# Patient Record
Sex: MALE | Race: BLACK OR AFRICAN AMERICAN | URBAN - METROPOLITAN AREA
[De-identification: names, ages, dates, MRNs, and addresses within clinical notes are randomized per-mention and may not be internally consistent; named-entity substitution may affect disease eponyms.]

---

## 2017-07-19 RX ORDER — TADALAFIL 20 MG/1
20 TABLET ORAL AS NEEDED
Qty: 30 TABLET | Refills: 5 | Status: SHIPPED | OUTPATIENT
Start: 2017-07-19 | End: 2017-12-14

## 2017-07-19 NOTE — TELEPHONE ENCOUNTER
Per [facility] pharmacy the patients coupon requires a new prescription for Cialis. Medication pended if ok to refill.     Requesting: Cialis  LOV: 12/5/16  RTC: 6 months  Last Labs: 12/2/16 only CMP, Lipid  Filled: 12/5/16 #30 with 5 refills    No future andrew

## 2017-08-09 ENCOUNTER — OFFICE VISIT (OUTPATIENT)
Dept: FAMILY MEDICINE CLINIC | Facility: CLINIC | Age: 47
End: 2017-08-09

## 2017-08-09 VITALS
SYSTOLIC BLOOD PRESSURE: 130 MMHG | BODY MASS INDEX: 29.8 KG/M2 | DIASTOLIC BLOOD PRESSURE: 80 MMHG | HEIGHT: 72 IN | HEART RATE: 64 BPM | RESPIRATION RATE: 16 BRPM | WEIGHT: 220 LBS

## 2017-08-09 DIAGNOSIS — M25.511 RIGHT SHOULDER PAIN, UNSPECIFIED CHRONICITY: Primary | ICD-10-CM

## 2017-08-09 PROCEDURE — 99213 OFFICE O/P EST LOW 20 MIN: CPT | Performed by: INTERNAL MEDICINE

## 2017-08-09 RX ORDER — METHYLPREDNISOLONE 4 MG/1
TABLET ORAL
Qty: 1 KIT | Refills: 0 | Status: SHIPPED | OUTPATIENT
Start: 2017-08-09 | End: 2018-02-10

## 2017-08-09 RX ORDER — DICLOFENAC SODIUM 75 MG/1
TABLET, DELAYED RELEASE ORAL
Qty: 60 TABLET | Refills: 2 | Status: SHIPPED | OUTPATIENT
Start: 2017-08-09 | End: 2018-02-10

## 2017-08-09 NOTE — PROGRESS NOTES
CC: Patient presents with:  Shoulder Pain: R x 1 week       HPI:   Rt shoulder pain, for 1 week, aching with movements. Current Outpatient Prescriptions:  methylPREDNISolone (MEDROL) 4 MG Oral Tablet Therapy Pack As directed.  Disp: 1 kit Rfl: 0 Signed Prescriptions Disp Refills    methylPREDNISolone (MEDROL) 4 MG Oral Tablet Therapy Pack 1 kit 0      Sig: As directed. Diclofenac Sodium 75 MG Oral Tab EC 60 tablet 2      Sig: Take 1 tablet BID for 10 days and then prn.           None     A

## 2017-12-14 RX ORDER — ZOLPIDEM TARTRATE 10 MG/1
TABLET ORAL
Qty: 30 TABLET | Refills: 5 | Status: CANCELLED
Start: 2017-12-14

## 2017-12-14 NOTE — TELEPHONE ENCOUNTER
Requesting lab orders and refills   Zolpidem Tartrate 10mg, Atorvastatin 10 mg and Cialis 20 mg  LOV: 8/9/17  RTC: 3 mos  Last Labs: 12/102/16  Filled: Zolpidem Umymdeol63/5/16 #30 with 5 refills  Atorvastatin 12/5/16#90 with 3 refills  Cialis 20 mg 07/19/

## 2017-12-14 NOTE — TELEPHONE ENCOUNTER
Pt would like a refill on all these 3 medication and  also wants lab order s his previous order     Zolpidem Tartrate 10 MG Oral Tab 30 tablet 5 2016    Sig :  TAKE 1 TABLET BY MOUTH NIGHTLY AS NEEDED FOR SLEEP     Route:   (none)         Atorv

## 2017-12-18 ENCOUNTER — OFFICE VISIT (OUTPATIENT)
Dept: OCCUPATIONAL MEDICINE | Age: 47
End: 2017-12-18

## 2017-12-18 DIAGNOSIS — Z02.89 VISIT FOR OCCUPATIONAL HEALTH EXAMINATION: Primary | ICD-10-CM

## 2017-12-18 DIAGNOSIS — Z02.89 ENCOUNTER FOR OCCUPATIONAL HEALTH EXAMINATION: ICD-10-CM

## 2017-12-18 PROCEDURE — OH063 AUDIOMETRIC TESTING INTERP: Performed by: PREVENTIVE MEDICINE

## 2017-12-18 PROCEDURE — 92552 PURE TONE AUDIOMETRY AIR: CPT | Performed by: PREVENTIVE MEDICINE

## 2017-12-18 PROCEDURE — OH123 RAPID TEST DRUG KIT & COLLECTION 5 PANEL: Performed by: PREVENTIVE MEDICINE

## 2017-12-20 RX ORDER — ATORVASTATIN CALCIUM 10 MG/1
10 TABLET, FILM COATED ORAL
Qty: 30 TABLET | Refills: 0 | Status: SHIPPED | OUTPATIENT
Start: 2017-12-20 | End: 2018-02-10

## 2017-12-20 RX ORDER — TADALAFIL 20 MG/1
20 TABLET ORAL AS NEEDED
Qty: 30 TABLET | Refills: 0 | Status: SHIPPED | OUTPATIENT
Start: 2017-12-20 | End: 2018-02-10

## 2018-01-18 RX ORDER — ATORVASTATIN CALCIUM 10 MG/1
TABLET, FILM COATED ORAL
Qty: 30 TABLET | Refills: 0 | Status: CANCELLED | OUTPATIENT
Start: 2018-01-18

## 2018-01-18 NOTE — TELEPHONE ENCOUNTER
Cholesterol Medication Protocol Failed1/18 11:45 AM   ALT < 80    ALT resulted within past year    Lipid panel within past 12 months    Appointment within past 12 or next 3 months     Requesting atorvastatin 10mg  LOV: 8/9/17  RTC: 3 months  Last Relevant Labs: 12/2/16  Filled: 12/20/17 # 30  with 0 refills    No future appointments.     Please call patient to schedule appt to refill medication

## 2018-02-06 NOTE — TELEPHONE ENCOUNTER
Patient made appt for Dr. Saleem Medrano for 2/10/18 - will get labs and refills. Please close this encounter that has refill pending. Thank you.

## 2018-02-06 NOTE — TELEPHONE ENCOUNTER
Future Appointments  Date Time Provider Amari Evans   2/10/2018 10:30 AM Vic Ramírez MD [facility] [address]

## 2018-02-10 ENCOUNTER — OFFICE VISIT (OUTPATIENT)
Dept: FAMILY MEDICINE CLINIC | Facility: CLINIC | Age: 48
End: 2018-02-10

## 2018-02-10 VITALS
SYSTOLIC BLOOD PRESSURE: 122 MMHG | RESPIRATION RATE: 16 BRPM | HEART RATE: 66 BPM | WEIGHT: 223 LBS | BODY MASS INDEX: 30.2 KG/M2 | DIASTOLIC BLOOD PRESSURE: 86 MMHG | HEIGHT: 72 IN

## 2018-02-10 DIAGNOSIS — G47.00 INSOMNIA, UNSPECIFIED TYPE: ICD-10-CM

## 2018-02-10 DIAGNOSIS — M54.5 ACUTE MIDLINE LOW BACK PAIN, WITH SCIATICA PRESENCE UNSPECIFIED: ICD-10-CM

## 2018-02-10 DIAGNOSIS — Z12.5 PROSTATE CANCER SCREENING: ICD-10-CM

## 2018-02-10 DIAGNOSIS — R79.89 ELEVATED LFTS: ICD-10-CM

## 2018-02-10 DIAGNOSIS — E78.00 PURE HYPERCHOLESTEROLEMIA: Primary | ICD-10-CM

## 2018-02-10 DIAGNOSIS — M25.511 CHRONIC RIGHT SHOULDER PAIN: ICD-10-CM

## 2018-02-10 DIAGNOSIS — G89.29 CHRONIC RIGHT SHOULDER PAIN: ICD-10-CM

## 2018-02-10 DIAGNOSIS — N52.9 ERECTILE DYSFUNCTION, UNSPECIFIED ERECTILE DYSFUNCTION TYPE: ICD-10-CM

## 2018-02-10 DIAGNOSIS — Z86.2 HISTORY OF ANEMIA: ICD-10-CM

## 2018-02-10 PROCEDURE — 99214 OFFICE O/P EST MOD 30 MIN: CPT | Performed by: FAMILY MEDICINE

## 2018-02-10 RX ORDER — MELOXICAM 15 MG/1
15 TABLET ORAL
Qty: 30 TABLET | Refills: 2 | Status: SHIPPED | OUTPATIENT
Start: 2018-02-10 | End: 2019-10-07 | Stop reason: ALTCHOICE

## 2018-02-10 RX ORDER — TADALAFIL 20 MG/1
20 TABLET ORAL AS NEEDED
Qty: 30 TABLET | Refills: 0 | Status: SHIPPED | OUTPATIENT
Start: 2018-02-10 | End: 2018-07-20

## 2018-02-10 RX ORDER — ZOLPIDEM TARTRATE 10 MG/1
TABLET ORAL
Qty: 30 TABLET | Refills: 2 | Status: SHIPPED | OUTPATIENT
Start: 2018-02-10 | End: 2019-03-29

## 2018-02-10 RX ORDER — ATORVASTATIN CALCIUM 10 MG/1
10 TABLET, FILM COATED ORAL
Qty: 90 TABLET | Refills: 0 | Status: SHIPPED | OUTPATIENT
Start: 2018-02-10 | End: 2018-05-14

## 2018-02-10 NOTE — PROGRESS NOTES
[facility] Visit Note  2/10/2018      Subjective:      Patient ID: Jessika Robertson is a 52year old male.     Chief Complaint:  Patient presents with:  Medication Follow-Up  Shoulder Pain: still c/o of R shoulder pain - saw Dr. Philip Chu in 8/2017  Low shoulder: pain with extreme of external rotation, Neers and lift off with 5-/5 strength and less strength compared to the L side. Rest of shoulder exam normal (speed's test, internal rotation, Hawkin's, cross arm, supraspinatus)      Assessment:     1.  Pur

## 2018-02-10 NOTE — PATIENT INSTRUCTIONS
Exercises to Strengthen Your Lower Back  Strong lower back and abdominal muscles work together to support your spine. The exercises below will help strengthen the lower back. It is important that you begin exercising slowly and increase levels gradually. Standin. Wall squats: Stand with your back against the wall. Move your feet about 12 inches away from the wall. Tighten your stomach muscles, and slowly bend your knees until they are at about a 45 degree angle. Do not go down too far.  Hold about 5 se 4. Abdominal crunch: Perform a pelvic tilt (above) flattening your lower back against the floor. Holding the tension in your abdominal muscles, take another breath and raise your shoulder blades off the ground (this is not a full sit-up).  Keep your head in The abdominal lift brace with march strengthens your lower abdominal muscles, helping you keep your pelvis and back stable:  · Lie on the floor with both knees bent. Put your feet flat on the floor and your arms by your sides.  Tighten your abdominal musc · Hold for 5 seconds. Return to starting position. · Repeat 5 times. Date Last Reviewed: 10/11/2015  © 1056-2190 [employer]. [address] [facility], [address] [place] [address]. All rights reserved.  This information is not intended as a substitute f To start, lie on your back with your knees bent and feet flat on the floor. Don’t press your neck or lower back to the floor. Breathe deeply. You should feel comfortable and relaxed in this position. · Rest your right ankle on your left knee.   · Place a t Frozen shoulder is another name for adhesive capsulitis, which causes restricted movement in the shoulder. If you have frozen shoulder, this stretch may cause discomfort, especially when you first get started.  A few months may pass before you achieve the r Frozen shoulder is another name for adhesive capsulitis. This causes restricted movement in the shoulder. If you have frozen shoulder, this stretch may cause discomfort, especially when you first get started.  A few months may pass before you achieve the re © 1598-5770 [employer] [address]. [address] [facility], [facility] [place]. All rights reserved. This information is not intended as a substitute for professional medical care. Always follow your healthcare professional's instructions.

## 2018-02-13 ENCOUNTER — LAB ENCOUNTER (OUTPATIENT)
Dept: LAB | Age: 48
End: 2018-02-13
Attending: FAMILY MEDICINE
Payer: COMMERCIAL

## 2018-02-13 DIAGNOSIS — Z12.5 PROSTATE CANCER SCREENING: ICD-10-CM

## 2018-02-13 DIAGNOSIS — E78.00 PURE HYPERCHOLESTEROLEMIA: ICD-10-CM

## 2018-02-13 DIAGNOSIS — Z86.2 HISTORY OF ANEMIA: ICD-10-CM

## 2018-02-13 LAB
ALBUMIN SERPL-MCNC: 3.9 G/DL (ref 3.5–4.8)
ALP LIVER SERPL-CCNC: 67 U/L (ref 45–117)
ALT SERPL-CCNC: 56 U/L (ref 17–63)
AST SERPL-CCNC: 45 U/L (ref 15–41)
BASOPHILS # BLD AUTO: 0.05 X10(3) UL (ref 0–0.1)
BASOPHILS NFR BLD AUTO: 0.9 %
BILIRUB SERPL-MCNC: 1.1 MG/DL (ref 0.1–2)
BUN BLD-MCNC: 9 MG/DL (ref 8–20)
CALCIUM BLD-MCNC: 9.5 MG/DL (ref 8.3–10.3)
CHLORIDE: 103 MMOL/L (ref 101–111)
CHOLEST SMN-MCNC: 202 MG/DL (ref ?–200)
CO2: 30 MMOL/L (ref 22–32)
COMPLEXED PSA SERPL-MCNC: 0.92 NG/ML (ref 0.01–4)
CREAT BLD-MCNC: 1.02 MG/DL (ref 0.7–1.3)
EOSINOPHIL # BLD AUTO: 0.28 X10(3) UL (ref 0–0.3)
EOSINOPHIL NFR BLD AUTO: 5.1 %
ERYTHROCYTE [DISTWIDTH] IN BLOOD BY AUTOMATED COUNT: 13.8 % (ref 11.5–16)
GLUCOSE BLD-MCNC: 86 MG/DL (ref 70–99)
HCT VFR BLD AUTO: 39.5 % (ref 37–53)
HDLC SERPL-MCNC: 62 MG/DL (ref 45–?)
HDLC SERPL: 3.26 {RATIO} (ref ?–4.97)
HGB BLD-MCNC: 13.4 G/DL (ref 13–17)
IMMATURE GRANULOCYTE COUNT: 0 X10(3) UL (ref 0–1)
IMMATURE GRANULOCYTE RATIO %: 0 %
LDLC SERPL CALC-MCNC: 123 MG/DL (ref ?–130)
LYMPHOCYTES # BLD AUTO: 2.14 X10(3) UL (ref 0.9–4)
LYMPHOCYTES NFR BLD AUTO: 39.3 %
M PROTEIN MFR SERPL ELPH: 8.2 G/DL (ref 6.1–8.3)
MCH RBC QN AUTO: 27.2 PG (ref 27–33.2)
MCHC RBC AUTO-ENTMCNC: 33.9 G/DL (ref 31–37)
MCV RBC AUTO: 80.3 FL (ref 80–99)
MONOCYTES # BLD AUTO: 0.47 X10(3) UL (ref 0.1–1)
MONOCYTES NFR BLD AUTO: 8.6 %
NEUTROPHIL ABS PRELIM: 2.51 X10 (3) UL (ref 1.3–6.7)
NEUTROPHILS # BLD AUTO: 2.51 X10(3) UL (ref 1.3–6.7)
NEUTROPHILS NFR BLD AUTO: 46.1 %
NONHDLC SERPL-MCNC: 140 MG/DL (ref ?–130)
PLATELET # BLD AUTO: 221 10(3)UL (ref 150–450)
POTASSIUM SERPL-SCNC: 4 MMOL/L (ref 3.6–5.1)
RBC # BLD AUTO: 4.92 X10(6)UL (ref 4.3–5.7)
RED CELL DISTRIBUTION WIDTH-SD: 40.1 FL (ref 35.1–46.3)
SODIUM SERPL-SCNC: 140 MMOL/L (ref 136–144)
TRIGL SERPL-MCNC: 87 MG/DL (ref ?–150)
VLDLC SERPL CALC-MCNC: 17 MG/DL (ref 5–40)
WBC # BLD AUTO: 5.5 X10(3) UL (ref 4–13)

## 2018-02-13 PROCEDURE — 36415 COLL VENOUS BLD VENIPUNCTURE: CPT | Performed by: FAMILY MEDICINE

## 2018-02-13 PROCEDURE — 84153 ASSAY OF PSA TOTAL: CPT | Performed by: FAMILY MEDICINE

## 2018-02-13 PROCEDURE — 80053 COMPREHEN METABOLIC PANEL: CPT | Performed by: FAMILY MEDICINE

## 2018-02-13 PROCEDURE — 85025 COMPLETE CBC W/AUTO DIFF WBC: CPT | Performed by: FAMILY MEDICINE

## 2018-02-13 PROCEDURE — 80061 LIPID PANEL: CPT | Performed by: FAMILY MEDICINE

## 2018-04-05 DIAGNOSIS — E78.00 PURE HYPERCHOLESTEROLEMIA: ICD-10-CM

## 2018-04-05 RX ORDER — ATORVASTATIN CALCIUM 10 MG/1
TABLET, FILM COATED ORAL
Qty: 30 TABLET | Refills: 0 | OUTPATIENT
Start: 2018-04-05

## 2018-04-05 NOTE — TELEPHONE ENCOUNTER
Requesting Atorvastatin  LOV:  2/10/18  RTC: none   Last Relevant Labs: pp  Filled: 2/10/18 #90 with 0 refills    No future appointments.

## 2018-05-14 DIAGNOSIS — E78.00 PURE HYPERCHOLESTEROLEMIA: ICD-10-CM

## 2018-05-14 RX ORDER — ATORVASTATIN CALCIUM 10 MG/1
10 TABLET, FILM COATED ORAL
Qty: 30 TABLET | Refills: 0 | Status: SHIPPED
Start: 2018-05-14 | End: 2018-06-13

## 2018-05-14 NOTE — TELEPHONE ENCOUNTER
From: Gene Mckeon  Sent: 5/14/2018 8:56 AM CDT  Subject: Medication Renewal Request    Gene Mckeon would like a refill of the following medications:     atorvastatin 10 MG Oral Tab Yanira Bolivar MD]    Preferred pharmacy: [place] [id number]

## 2018-06-13 DIAGNOSIS — E78.00 PURE HYPERCHOLESTEROLEMIA: ICD-10-CM

## 2018-06-14 RX ORDER — ATORVASTATIN CALCIUM 10 MG/1
TABLET, FILM COATED ORAL
Qty: 30 TABLET | Refills: 0 | Status: SHIPPED | OUTPATIENT
Start: 2018-06-14 | End: 2018-08-06

## 2018-06-14 NOTE — TELEPHONE ENCOUNTER
Requesting Atorvastatin  LOV: 2/10/18  RTC: prn  Last Relevant Labs: 2/2018  Filled: 5/14/18 #30 with 0 refills    No future appointments. Patient had elevated liver enzymes with last labs and MD ordered u/s which patient did not complete. I called him to verify if he had this done. Patient was immediately defensive in tone and said he doesn't understand why we would reject his refill because his cholesterol and liver are two different things. I told patient we did not reject anything. I was calling to make sure he would get this done because his cholesterol medication can affect the liver and Dr wanted to make sure his liver was okay since there was an abnormal lab done in February. I further explained this is for his safety. We would not want to continue giving him a medication that may be affecting his liver function. The u/s would let us know if there is any anatomical problem that is causing his enzyme elevation and it is for his safety that he gets this done. Patient said he was never explained this and he will get his u/s done.   I sent over a 30 day refill for patient in order to give enough time for him to complete the u/s

## 2018-07-20 ENCOUNTER — OFFICE VISIT (OUTPATIENT)
Dept: FAMILY MEDICINE CLINIC | Facility: CLINIC | Age: 48
End: 2018-07-20
Payer: COMMERCIAL

## 2018-07-20 VITALS
BODY MASS INDEX: 29.8 KG/M2 | RESPIRATION RATE: 16 BRPM | HEIGHT: 72 IN | WEIGHT: 220 LBS | TEMPERATURE: 98 F | SYSTOLIC BLOOD PRESSURE: 126 MMHG | HEART RATE: 66 BPM | DIASTOLIC BLOOD PRESSURE: 80 MMHG

## 2018-07-20 DIAGNOSIS — N63.20 LEFT BREAST LUMP: Primary | ICD-10-CM

## 2018-07-20 DIAGNOSIS — N52.9 ERECTILE DYSFUNCTION, UNSPECIFIED ERECTILE DYSFUNCTION TYPE: ICD-10-CM

## 2018-07-20 PROCEDURE — 99213 OFFICE O/P EST LOW 20 MIN: CPT | Performed by: FAMILY MEDICINE

## 2018-07-20 RX ORDER — TADALAFIL 20 MG/1
20 TABLET ORAL AS NEEDED
Qty: 30 TABLET | Refills: 5 | Status: SHIPPED | OUTPATIENT
Start: 2018-07-20 | End: 2018-08-06

## 2018-07-20 NOTE — PROGRESS NOTES
[facility] Visit Note  7/20/2018      Subjective:      Patient ID: Ja Dumont is a 52year old male. Chief Complaint:  Patient presents with:  Breast Lump: C/O lump left breast x 2 months.       HPI:  Ja Dumont is a 52year old male who

## 2018-07-25 ENCOUNTER — TELEPHONE (OUTPATIENT)
Dept: FAMILY MEDICINE CLINIC | Facility: CLINIC | Age: 48
End: 2018-07-25

## 2018-07-25 DIAGNOSIS — N63.20 LEFT BREAST LUMP: Primary | ICD-10-CM

## 2018-07-25 NOTE — TELEPHONE ENCOUNTER
I called back to verify. Per Edda Terrympers in mammography they need to have an order for u/s as well as diagnostic left breast.  Order placed. Patient had palpable lump.

## 2018-07-25 NOTE — TELEPHONE ENCOUNTER
Lovely Treviño from [facility] mammography calling [phone], they need order for Left Breast US added to the system

## 2018-07-27 ENCOUNTER — HOSPITAL ENCOUNTER (OUTPATIENT)
Dept: MAMMOGRAPHY | Facility: HOSPITAL | Age: 48
Discharge: HOME | End: 2018-07-27
Attending: FAMILY MEDICINE
Payer: COMMERCIAL

## 2018-07-27 DIAGNOSIS — N63.20 LEFT BREAST LUMP: ICD-10-CM

## 2018-07-27 PROCEDURE — 77065 DX MAMMO INCL CAD UNI: CPT | Performed by: FAMILY MEDICINE

## 2018-07-27 PROCEDURE — 76642 ULTRASOUND BREAST LIMITED: CPT | Performed by: FAMILY MEDICINE

## 2018-10-26 ENCOUNTER — CHARTING TRANS (OUTPATIENT)
Dept: OTHER | Age: 48
End: 2018-10-26

## 2018-12-02 VITALS — TEMPERATURE: 98.3 F

## 2018-12-10 PROBLEM — S83.271A COMPLEX TEAR OF LATERAL MENISCUS OF RIGHT KNEE AS CURRENT INJURY: Status: ACTIVE | Noted: 2018-12-10

## 2018-12-10 PROBLEM — M17.11 PRIMARY OSTEOARTHRITIS OF RIGHT KNEE: Status: ACTIVE | Noted: 2018-12-10

## 2019-02-20 PROBLEM — Z47.89 ORTHOPEDIC AFTERCARE: Status: ACTIVE | Noted: 2019-02-20
